# Patient Record
Sex: FEMALE | Race: WHITE
[De-identification: names, ages, dates, MRNs, and addresses within clinical notes are randomized per-mention and may not be internally consistent; named-entity substitution may affect disease eponyms.]

---

## 2021-01-22 LAB
ANION GAP SERPL CALC-SCNC: 8 MMOL/L (ref 5–19)
APPEARANCE UR: CLEAR
APTT PPP: COLORLESS S
BARBITURATES UR QL SCN: NEGATIVE
BILIRUB UR QL STRIP: NEGATIVE
BUN SERPL-MCNC: 24 MG/DL (ref 7–20)
CALCIUM: 9.3 MG/DL (ref 8.4–10.2)
CHLORIDE SERPL-SCNC: 108 MMOL/L (ref 98–107)
CO2 SERPL-SCNC: 26 MMOL/L (ref 22–30)
ERYTHROCYTE [DISTWIDTH] IN BLOOD BY AUTOMATED COUNT: 15 % (ref 11.5–14)
ETHANOL SERPL-MCNC: < 10 MG/DL
GLUCOSE SERPL-MCNC: 99 MG/DL (ref 75–110)
GLUCOSE UR STRIP-MCNC: NEGATIVE MG/DL
HCT VFR BLD CALC: 37.4 % (ref 36–47)
HGB BLD-MCNC: 12 G/DL (ref 12–15.5)
KETONES UR STRIP-MCNC: NEGATIVE MG/DL
MCH RBC QN AUTO: 26.7 PG (ref 27–33.4)
MCHC RBC AUTO-ENTMCNC: 32.2 G/DL (ref 32–36)
MCV RBC AUTO: 83 FL (ref 80–97)
METHADONE UR QL SCN: NEGATIVE
NITRITE UR QL STRIP: NEGATIVE
PCP UR QL SCN: NEGATIVE
PH UR STRIP: 6 [PH] (ref 5–9)
PLATELET # BLD: 329 10^3/UL (ref 150–450)
POTASSIUM SERPL-SCNC: 4.1 MMOL/L (ref 3.6–5)
PROT UR STRIP-MCNC: NEGATIVE MG/DL
RBC # BLD AUTO: 4.51 10^6/UL (ref 3.72–5.28)
SP GR UR STRIP: 1.01
URINE AMPHETAMINES SCREEN: NEGATIVE
URINE BENZODIAZEPINES SCREEN: NEGATIVE
URINE COCAINE SCREEN: NEGATIVE
URINE MARIJUANA (THC) SCREEN: NEGATIVE
UROBILINOGEN UR-MCNC: NEGATIVE MG/DL (ref ?–2)
WBC # BLD AUTO: 9.5 10^3/UL (ref 4–10.5)

## 2021-01-22 NOTE — EKG REPORT
SEVERITY:- ABNORMAL ECG -

SINUS RHYTHM

LEFT ATRIAL ABNORMALITY

LVH WITH SECONDARY REPOLARIZATION ABNORMALITY

ANTERIOR Q WAVES, POSSIBLY DUE TO LVH VS OLD ANTEROSEPTAL MI

:

Confirmed by: Massimo Sierra MD 22-Jan-2021 11:55:04

## 2021-01-22 NOTE — RADIOLOGY REPORT (SQ)
EXAM DESCRIPTION:  CHEST 2 VIEWS



IMAGES COMPLETED DATE/TIME:  1/22/2021 12:37 pm



REASON FOR STUDY:  PRE OP TESTING



COMPARISON:  2008



EXAM PARAMETERS:  NUMBER OF VIEWS: two views

TECHNIQUE: Digital Frontal and Lateral radiographic views of the chest acquired.

RADIATION DOSE: NA

LIMITATIONS: none



FINDINGS:  LUNGS AND PLEURA: Mild chronic interstitial changes.  No acute infiltrate or effusion.  No
 mass.

MEDIASTINUM AND HILAR STRUCTURES: No masses or contour abnormalities.

HEART AND VASCULAR STRUCTURES: Heart normal size.  No evidence for failure.

BONES: No acute findings.

HARDWARE: None in the chest.

OTHER: No other significant finding.



IMPRESSION:  Chronic lung changes with no acute cardiopulmonary findings.



TECHNICAL DOCUMENTATION:  JOB ID:  [id number]

 2011 [employer]- All Rights Reserved



Reading location - IP/workstation name: [email or web address] [email or web address]

## 2021-01-27 ENCOUNTER — HOSPITAL ENCOUNTER (OUTPATIENT)
Dept: HOSPITAL 62 - OROUT | Age: 76
LOS: 1 days | Discharge: HOME | End: 2021-01-28
Attending: ORTHOPAEDIC SURGERY
Payer: MEDICARE

## 2021-01-27 DIAGNOSIS — M70.61: ICD-10-CM

## 2021-01-27 DIAGNOSIS — M06.9: ICD-10-CM

## 2021-01-27 DIAGNOSIS — Z79.899: ICD-10-CM

## 2021-01-27 DIAGNOSIS — M48.062: Primary | ICD-10-CM

## 2021-01-27 DIAGNOSIS — I11.0: ICD-10-CM

## 2021-01-27 DIAGNOSIS — M51.16: ICD-10-CM

## 2021-01-27 DIAGNOSIS — Z20.822: ICD-10-CM

## 2021-01-27 DIAGNOSIS — M43.16: ICD-10-CM

## 2021-01-27 DIAGNOSIS — M32.9: ICD-10-CM

## 2021-01-27 DIAGNOSIS — Z01.812: ICD-10-CM

## 2021-01-27 DIAGNOSIS — Z79.82: ICD-10-CM

## 2021-01-27 DIAGNOSIS — R26.9: ICD-10-CM

## 2021-01-27 DIAGNOSIS — Z90.49: ICD-10-CM

## 2021-01-27 DIAGNOSIS — I50.9: ICD-10-CM

## 2021-01-27 DIAGNOSIS — Z87.891: ICD-10-CM

## 2021-01-27 PROCEDURE — 81001 URINALYSIS AUTO W/SCOPE: CPT

## 2021-01-27 PROCEDURE — 00630 ANES PX LUMBAR REGION NOS: CPT

## 2021-01-27 PROCEDURE — 97116 GAIT TRAINING THERAPY: CPT

## 2021-01-27 PROCEDURE — 85027 COMPLETE CBC AUTOMATED: CPT

## 2021-01-27 PROCEDURE — 94799 UNLISTED PULMONARY SVC/PX: CPT

## 2021-01-27 PROCEDURE — 72020 X-RAY EXAM OF SPINE 1 VIEW: CPT

## 2021-01-27 PROCEDURE — 87070 CULTURE OTHR SPECIMN AEROBIC: CPT

## 2021-01-27 PROCEDURE — 97162 PT EVAL MOD COMPLEX 30 MIN: CPT

## 2021-01-27 PROCEDURE — 36415 COLL VENOUS BLD VENIPUNCTURE: CPT

## 2021-01-27 PROCEDURE — 80048 BASIC METABOLIC PNL TOTAL CA: CPT

## 2021-01-27 PROCEDURE — 93010 ELECTROCARDIOGRAM REPORT: CPT

## 2021-01-27 PROCEDURE — C1776 JOINT DEVICE (IMPLANTABLE): HCPCS

## 2021-01-27 PROCEDURE — 63047 LAM FACETEC & FORAMOT LUMBAR: CPT

## 2021-01-27 PROCEDURE — 93005 ELECTROCARDIOGRAM TRACING: CPT

## 2021-01-27 PROCEDURE — 71046 X-RAY EXAM CHEST 2 VIEWS: CPT

## 2021-01-27 PROCEDURE — 87635 SARS-COV-2 COVID-19 AMP PRB: CPT

## 2021-01-27 PROCEDURE — 97530 THERAPEUTIC ACTIVITIES: CPT

## 2021-01-27 PROCEDURE — 80307 DRUG TEST PRSMV CHEM ANLYZR: CPT

## 2021-01-27 PROCEDURE — 84132 ASSAY OF SERUM POTASSIUM: CPT

## 2021-01-27 PROCEDURE — C9803 HOPD COVID-19 SPEC COLLECT: HCPCS

## 2021-01-27 PROCEDURE — C9290 INJ, BUPIVACAINE LIPOSOME: HCPCS

## 2021-01-27 RX ADMIN — HYDROXYCHLOROQUINE SULFATE SCH MG: 200 TABLET, FILM COATED ORAL at 15:38

## 2021-01-27 RX ADMIN — OXYCODONE HYDROCHLORIDE PRN MG: 5 TABLET ORAL at 19:33

## 2021-01-27 RX ADMIN — AMLODIPINE BESYLATE SCH: 5 TABLET ORAL at 13:15

## 2021-01-27 RX ADMIN — Medication SCH ML: at 15:40

## 2021-01-27 RX ADMIN — LOSARTAN POTASSIUM SCH: 50 TABLET, FILM COATED ORAL at 13:16

## 2021-01-27 RX ADMIN — CEFAZOLIN SCH MLS/HR: 1 INJECTION, POWDER, FOR SOLUTION INTRAVENOUS at 16:01

## 2021-01-27 RX ADMIN — SENNOSIDES, DOCUSATE SODIUM SCH EACH: 50; 8.6 TABLET, FILM COATED ORAL at 18:25

## 2021-01-27 RX ADMIN — ASPIRIN SCH MG: 81 TABLET, CHEWABLE ORAL at 15:37

## 2021-01-27 RX ADMIN — FUROSEMIDE SCH MG: 20 TABLET ORAL at 15:37

## 2021-01-27 RX ADMIN — Medication SCH ML: at 22:15

## 2021-01-27 RX ADMIN — HYDROXYCHLOROQUINE SULFATE SCH: 200 TABLET, FILM COATED ORAL at 18:18

## 2021-01-27 NOTE — RADIOLOGY REPORT (SQ)
EXAM DESCRIPTION:  SPINE SINGLE VIEW; NO CHG FLUORO



IMAGES COMPLETED DATE/TIME:  1/27/2021 10:56 am



REASON FOR STUDY:  LUMBAR DECOMPRESSION MULTIPLE LEVELS ASSISTED WITH FLUORO IN OR M48.00  SPINAL KIMO
NOSIS, SITE UNSPECIFIED M47.9  SPONDYLOSIS, UNSPECIFIED Z79.899  OTHER LONG TERM (CURRENT) DRUG THERA
PY



COMPARISON:  None.



FLUOROSCOPY TIME:  0.3 minutes

1 images saved to PACS.



TECHNIQUE:  Intra-operative images acquired during surgical procedure to evaluate progress.

NUMBER OF IMAGES: 1



LIMITATIONS:  None.



FINDINGS:  Image from fluoro shows 2 instruments.  1 is directed toward the L3 vertebra and the other
 toward the L5 vertebra.



IMPRESSION:  Lumbar decompression with fluoro.  Refer to operative note for further information.



COMMENT:  Quality :  Final reports for procedures using fluoroscopy that document radiation exp
osure indices, or exposure time and number of fluorographic images (if radiation exposure indices are
 not available)

Please consult full operative report of the attending physician for description of the procedure.



TECHNICAL DOCUMENTATION:  JOB ID:  [id number]

 2011 [employer]- All Rights Reserved



Reading location - IP/workstation name: [email or web address]

## 2021-01-27 NOTE — RADIOLOGY REPORT (SQ)
EXAM DESCRIPTION:  SPINE SINGLE VIEW; NO CHG FLUORO



IMAGES COMPLETED DATE/TIME:  1/27/2021 10:56 am



REASON FOR STUDY:  LUMBAR DECOMPRESSION MULTIPLE LEVELS ASSISTED WITH FLUORO IN OR M48.00  SPINAL KIMO
NOSIS, SITE UNSPECIFIED M47.9  SPONDYLOSIS, UNSPECIFIED Z79.899  OTHER LONG TERM (CURRENT) DRUG THERA
PY



COMPARISON:  None.



FLUOROSCOPY TIME:  0.3 minutes

1 images saved to PACS.



TECHNIQUE:  Intra-operative images acquired during surgical procedure to evaluate progress.

NUMBER OF IMAGES: 1



LIMITATIONS:  None.



FINDINGS:  Image from fluoro shows 2 instruments.  1 is directed toward the L3 vertebra and the other
 toward the L5 vertebra.



IMPRESSION:  Lumbar decompression with fluoro.  Refer to operative note for further information.



COMMENT:  Quality :  Final reports for procedures using fluoroscopy that document radiation exp
osure indices, or exposure time and number of fluorographic images (if radiation exposure indices are
 not available)

Please consult full operative report of the attending physician for description of the procedure.



TECHNICAL DOCUMENTATION:  JOB ID:  [id number]

 2011 [employer]- All Rights Reserved



Reading location - IP/workstation name: ANNE MARIE

## 2021-01-27 NOTE — OPERATIVE REPORT
Operative Report


DATE OF SURGERY: 01/27/21


PREOPERATIVE DIAGNOSIS: L3-4 and L4-5 severe lumbar stenosis with neurogenic cl

audication.  L4-5 spondylolisthesis with lumbar radiculitis.  Degenerative disc 

disease lumbar spine.  Back pain


POSTOPERATIVE DIAGNOSIS: L3-4 and L4-5 severe lumbar stenosis with neurogenic 

claudication.  L4-5 spondylolisthesis with lumbar radiculitis.  Degenerative 

disc disease lumbar spine.  Back pain.  Status post L3-4 and L4-5 central 

decompression and bilateral foraminotomies


OPERATION: L3-4 and L4-5 central decompression and bilateral foraminotomies


SURGEON: ZENAIDA MELARA


1ST ASSISTANT: LILLY MALCOLM


ANESTHESIA: GA


COMPLICATIONS: 





None


ESTIMATED BLOOD LOSS: 450 cc of blood loss patient was given back 250 cc of Cell

Saver


PROCEDURE: 





The patient is brought into the room and placed under anesthesia and had a Bourgeois

catheter placed at the beginning of the case which is removed at the end of the 

case.  The patient received 2 g of Ancef and a surgical timeout was performed.


The patient was placed in the prone position on the Chuck table with a Devin 

frame attachment.  The Devin frame was turned up to open up the interspaces 

posteriorly.  Lateral C-arm fluoroscopy is used to zen the appropriate levels. 

After completion of prepping and draping having SCDs and a warming blanket 

placed on the patient already, a midline incision is carried down electrocautery

was used to maintain hemostasis.  The lumbodorsal fascia is incised on both 

sides of spinous processes down onto the lamina of L3 down to L5.


The spinous processes of L3 and L4 are resected.  Then the microscope was 

brought in under the microscope the bur is used to remove a significant part of 

the lamina and then regular and pneumatic Kerrisons are utilized to carry out 

central decompression and lateral recess decompression at L3-4 and L4-5.


After carrying out the decompression the extent of the decompression was 

verified using nerve hooks and the lateral C-arm fluoroscopy to verify that L3-

L5 was decompressed centrally. 


 No epidural bleeders or cerebrospinal fluid leakage is noted.  The wound is 

then irrigated with a liter of bacitracin irrigation.  The wound is 

reapproximated the fascia with 0 Vicryl and then the subcu with 2-0 Vicryl in a 

subcuticular closure with 3-0 Monocryl.  The wounds dressed in benzoin Steri-

Strips 4 x 4 and tape.





Please note this procedure could not have been done without the assistance of 

Lilly Malcolm working under the microscope carry out decompression and protecti

ng the dura.

## 2021-01-28 VITALS — SYSTOLIC BLOOD PRESSURE: 129 MMHG | DIASTOLIC BLOOD PRESSURE: 59 MMHG

## 2021-01-28 LAB
ERYTHROCYTE [DISTWIDTH] IN BLOOD BY AUTOMATED COUNT: 15.1 % (ref 11.5–14)
HCT VFR BLD CALC: 30.2 % (ref 36–47)
HGB BLD-MCNC: 9.7 G/DL (ref 12–15.5)
MCH RBC QN AUTO: 26.9 PG (ref 27–33.4)
MCHC RBC AUTO-ENTMCNC: 32 G/DL (ref 32–36)
MCV RBC AUTO: 84 FL (ref 80–97)
PLATELET # BLD: 269 10^3/UL (ref 150–450)
RBC # BLD AUTO: 3.58 10^6/UL (ref 3.72–5.28)
WBC # BLD AUTO: 10.5 10^3/UL (ref 4–10.5)

## 2021-01-28 RX ADMIN — LOSARTAN POTASSIUM SCH MG: 50 TABLET, FILM COATED ORAL at 09:43

## 2021-01-28 RX ADMIN — OXYCODONE HYDROCHLORIDE PRN MG: 5 TABLET ORAL at 06:15

## 2021-01-28 RX ADMIN — CEFAZOLIN SCH MLS/HR: 1 INJECTION, POWDER, FOR SOLUTION INTRAVENOUS at 02:04

## 2021-01-28 RX ADMIN — AMLODIPINE BESYLATE SCH MG: 5 TABLET ORAL at 09:43

## 2021-01-28 RX ADMIN — ASPIRIN SCH MG: 81 TABLET, CHEWABLE ORAL at 09:43

## 2021-01-28 RX ADMIN — FUROSEMIDE SCH MG: 20 TABLET ORAL at 07:25

## 2021-01-28 RX ADMIN — SENNOSIDES, DOCUSATE SODIUM SCH EACH: 50; 8.6 TABLET, FILM COATED ORAL at 09:43

## 2021-01-28 RX ADMIN — HYDROXYCHLOROQUINE SULFATE SCH MG: 200 TABLET, FILM COATED ORAL at 09:46

## 2021-01-28 RX ADMIN — Medication SCH ML: at 06:15

## 2021-01-28 NOTE — PDOC DISCHARGE SUMMARY
General





- Admit/Disc Date/PCP


Admission Date/Primary Care Provider: 


01/27/2021


Discharge Date: 01/28/21





- Discharge Diagnosis


Final Diagnosis: 


Spinal stenosis L3-L5:  S/P Lumbar decompression L3-L5 








- Assessment


Summary: 


Patient was admitted 01/27/2021 following Lumbar decompression, please see op 

note for details.    





- Additional Information


Resuscitation Status: Full Code


Discharge Diet: Regular


Discharge Activity: No Driving, No Lifting Over 10 Pounds, No 

Lifting/Push/Pulling, No tub bath


Referrals: 


ZENAIDA MELARA MD [ASSOCIATE] - 02/05/21 9:20 am


Home Medications: 








Carvedilol Phosphate [Coreg CR 10 mg Ext. Release Capsule] 10 mg PO HSP PRN 

08/07/13 


Hydroxychloroquine Sulfate [Plaquenil 200 mg Tablet] 200 mg PO BID 08/07/13 


Pravastatin Sodium [Pravachol] 20 mg PO DAILY 08/07/13 


Sumatriptan Succinate [Imitrex] 6 mg SQ PRN PRN 08/07/13 


Amlodipine Besylate [Norvasc 5 mg Tablet] 5 mg PO DAILY 01/26/21 


Furosemide [Lasix 20 mg Tablet] 20 mg PO QAM 01/26/21 


Losartan Potassium 100 mg PO DAILY 01/26/21 


Aspirin 1 tab PO DAILY 01/27/21 


Acetaminophen [Tylenol 650 mg Supp] 650 mg MO Q4HP PRN  supp.rect 01/28/21 


Methocarbamol [Robaxin 750 mg Tablet] 750 mg PO Q8HP PRN  tablet 01/28/21 


Normal Saline [Saline Flush 2.5 ml Monoject Prefil Syrin] 2.5 ml IV Q8  

disp.syrin 01/28/21 


Ondansetron HCl/Pf [Zofran Inj/Pf 4 mg/2 ml Sdv] 4 mg IV Q6HP PRN  vial 01/28/21




Oxycodone HCl [Oxy-Ir 5 mg Tablet] 5 mg PO Q4HP PRN  tablet 01/28/21 











History of Present Illiness


History of Present Illness: 


MATTY CASTILLO is a 75 year old female








Hospital Course


Hospital Course: 


Patient was admited on 1/27/2021 following lumbar decompression, see op note for

details.  No complications were noted during the hospital course.  patient was 

able to meet PT goals.





Physical Exam


Vital Signs: 


                                        











Temp Pulse Resp BP Pulse Ox


 


 97.6 F   80   18   133/64 H  93 


 


 01/28/21 07:37  01/28/21 03:34  01/28/21 03:34  01/28/21 03:34  01/28/21 07:43








                                 Intake & Output











 01/27/21 01/28/21 01/29/21





 06:59 06:59 06:59


 


Intake Total 0 3787 


 


Output Total  650 


 


Balance 0 3137 


 


Weight 81.65 kg 75 kg 











General appearance: PRESENT: no acute distress, cooperative


Ear exam: ABSENT: bleeding, drainage


Mouth exam: PRESENT: moist


Pulses: PRESENT: +2 pedal pulses bilateral


Rectal exam: PRESENT: deferred


Extremities exam: ABSENT: calf tenderness, joint swelling


Musculoskeletal exam: PRESENT: ambulatory, other - lumbar spine:  dressing is 

intact.  bloody drainage is noted.


Neurological exam: PRESENT: alert, altered, awake


Skin exam: PRESENT: dry, normal color





Results


Laboratory Results: 


                                        











WBC  10.5 10^3/uL (4.0-10.5)   01/28/21  06:00    


 


RBC  3.58 10^6/uL (3.72-5.28)  L  01/28/21  06:00    


 


Hgb  9.7 g/dL (12.0-15.5)  L  01/28/21  06:00    


 


Hct  30.2 % (36.0-47.0)  L  01/28/21  06:00    


 


MCV  84 fl (80-97)   01/28/21  06:00    


 


MCH  26.9 pg (27.0-33.4)  L  01/28/21  06:00    


 


MCHC  32.0 g/dL (32.0-36.0)   01/28/21  06:00    


 


RDW  15.1 % (11.5-14.0)  H  01/28/21  06:00    


 


Plt Count  269 10^3/uL (150-450)   01/28/21  06:00    


 


Sodium  141.5 mmol/L (137-145)   01/22/21  09:22    


 


Potassium  4.0 mmol/L (3.6-5.0)   01/27/21  06:54    


 


Chloride  108 mmol/L ()  H  01/22/21  09:22    


 


Carbon Dioxide  26 mmol/L (22-30)   01/22/21  09:22    


 


Anion Gap  8  (5-19)   01/22/21  09:22    


 


BUN  24 mg/dL (7-20)  H  01/22/21  09:22    


 


Creatinine  0.81 mg/dL (0.52-1.25)   01/22/21  09:22    


 


Est GFR ( Amer)  > 60  (>60)   01/22/21  09:22    


 


Est GFR (MDRD) Non-Af  > 60  (>60)   01/22/21  09:22    


 


Glucose  99 mg/dL ()   01/22/21  09:22    


 


Calcium  9.3 mg/dL (8.4-10.2)   01/22/21  09:22    


 


Urine Color  COLORLESS   01/22/21  09:00    


 


Urine Appearance  CLEAR   01/22/21  09:00    


 


Urine pH  6.0  (5.0-9.0)   01/22/21  09:00    


 


Ur Specific Gravity  1.006   01/22/21  09:00    


 


Urine Protein  NEGATIVE mg/dL (NEGATIVE)   01/22/21  09:00    


 


Urine Glucose (UA)  NEGATIVE mg/dL (NEGATIVE)   01/22/21  09:00    


 


Urine Ketones  NEGATIVE mg/dL (NEGATIVE)   01/22/21  09:00    


 


Urine Blood  NEGATIVE  (NEGATIVE)   01/22/21  09:00    


 


Urine Nitrite  NEGATIVE  (NEGATIVE)   01/22/21  09:00    


 


Urine Bilirubin  NEGATIVE  (NEGATIVE)   01/22/21  09:00    


 


Urine Urobilinogen  NEGATIVE mg/dL (<2.0)   01/22/21  09:00    


 


Ur Leukocyte Esterase  TRACE  (NEGATIVE)  H  01/22/21  09:00    


 


Urine WBC (Auto)  2 /HPF  01/22/21  09:00    


 


Squamous Epi Cells Auto  1 /HPF  01/22/21  09:00    


 


Urine Ascorbic Acid  NEGATIVE  (NEGATIVE)   01/22/21  09:00    


 


Urine Opiates Screen  NEGATIVE   01/22/21  09:00    


 


Urine Methadone Screen  NEGATIVE   01/22/21  09:00    


 


Ur Barbiturates Screen  NEGATIVE   01/22/21  09:00    


 


Ur Phencyclidine Scrn  NEGATIVE   01/22/21  09:00    


 


Ur Amphetamines Screen  NEGATIVE   01/22/21  09:00    


 


U Benzodiazepines Scrn  NEGATIVE   01/22/21  09:00    


 


Urine Cocaine Screen  NEGATIVE   01/22/21  09:00    


 


U Marijuana (THC) Screen  NEGATIVE   01/22/21  09:00    


 


Serum Alcohol  < 10 mg/dL (NONE DETECTED)   01/22/21  09:22    


 


COVID-19 Source  See comment   01/22/21  10:11    


 


COVID-19 (TOD)  Not Detected  (Not Detect)   01/22/21  10:11    











Impressions: 


                                        





Chest X-Ray  01/22/21 00:00


IMPRESSION:  Chronic lung changes with no acute cardiopulmonary findings.


 








Fluoroscopy  01/27/21 00:00


IMPRESSION:  Lumbar decompression with fluoro.  Refer to operative note for 

further information.


 








Spine X-Ray  01/27/21 00:00


IMPRESSION:  Lumbar decompression with fluoro.  Refer to operative note for 

further information.